# Patient Record
Sex: FEMALE | Race: WHITE | NOT HISPANIC OR LATINO | Employment: STUDENT | URBAN - METROPOLITAN AREA
[De-identification: names, ages, dates, MRNs, and addresses within clinical notes are randomized per-mention and may not be internally consistent; named-entity substitution may affect disease eponyms.]

---

## 2022-03-28 ENCOUNTER — REFERRAL (OUTPATIENT)
Dept: TRANSPLANT | Facility: CLINIC | Age: 22
End: 2022-03-28

## 2022-03-28 NOTE — TELEPHONE ENCOUNTER
"Donor Intake Start:3/14/22Donor Intake Complete:3/21/22  Expiration Date:22  Gender:FemalePreferred Language:English  Full Name:Elisa Stock  Needed:[not answered]  Phone Number:[phone] Phone:  Contact Preference:[not answered]Best Contact Time:Noon - 5pm  Emergency Contact:Cliff ChatmanOdilia Contact #:[phone]  Relationship to Contact:Contact is my parent  :02Age:21  Country:[place]  Address:[address] [place] [address] DriveCity:[place]  State:[place] Code:[id number]  Height:5'6\"Weight:125lbs  BMI:20.2  Employment Status:StudentHas PTO for donation?[not answered]  Occupation:[not answered]Requires Heavy Lifting?[not answered]  Education Level:High SchoolMarital Status:Single  Exercise Routine:4-6/WeekHealth Insurance:  Yes  Blood Type:AEthnicity/Race:White  Donor Type:Directed Donor  Prefer Remote Donation:[not answered]  Physician:Alex Barragan  [place], [place]  Donating for Recipient Transfer  Recipient's Center:[unknown]   Recipient's Name:Gomez Rojas :[birth year]  Recipient's Status:UnknownHow DD knows Recip:Other  DD communicates w/ Recip:Less Than Once A Month  Indicated possible interest in:  Motivation to donate:  I want to donate my liver to my uncle who is currently on the donor list because I don't want him to die waiting on a liver.  Living Donor Pre-Screening  [place]  [place]  Will Accept Blood Transfusions?  Yes  Has been Diagnosed with Kidney Disease?  No  Has had a Heart Attack?  No  Has Diabetes?  No  Has had Cancer?  No  Has had Kidney Stones?[not answered]  Has ever been Pregnant?  No  Is Planning on Pregnancy?  No  Is Taking Birth Control?  No  Has Used Tobacco  No  Has HIV?  No  Is Currently Incarcerated?  No  Is Currently Residing in [place]  Yes  History Misc  Has Allergies?  No  Has had Surgeries?  Yes  Surgery When  Hip Labrum repair (right) 2016  Hip labrum repair (left) May 20th 2017  Takes " Medication?  Yes  Medication Dose Frequency  Ibphrophen 2 pills as needed  Medical History  History of High BP?  Never  Has History Of CABG (bypass surgery)?  No  History of Blood Clots?  Never  History of Coronary Disease?  Never  Has Stents Implanted?  No  Has History of Chest Pain with Exercise?  No  Has History of Chest Pain at Other Times?  No  Results of Climbing 2 Flights of Stairs?No Problem  Has had Stress Test within Last Year?  No  Has had Stroke?  No  Has had Leg Bypass?  No  History of Lung Disease?  Never  History of COPD?  Never  History of TB?  Never    - Is TB Active?[not answered]  History of Pneumonia?  Never  Has Respiratory Issues?  No  Has Gastro Issues?  No  History of Gallstones?  Never  History of Pancreatitis?  Never  History of Liver Disease?[not answered]  History of Hepatitis B?[not answered]    - Is Hep B Active?[not answered]  History of Hepatitis C?[not answered]  History of Bleeding Problem?  Never  History of UTIs?  No  History of Kidney Damage?  Never  History of Proteinuria?  Never  History of Hematuria?  Never  History of Neuro Disease?  Never  History of Seizure?  Never  History of Lupus?  Never  History of Paralysis?  Never  History of Arthritis?  Never  History of Neuropathy?  Never  History of Depression?  Never  History of Anxiety?  Never  History of Documented Psychiatric Illness?  Never  History of Fibroid Uterus?  Never  History of Endometriosis?  Never  History of Polycystic Ovaries?  Never  Has had Miscarriages?  No  Has had Abortions?  No  Has had Transfusions?  No  History of Obesity?  No  History of Fabry's Disease?  No  History of Sickle Cell Disease?  No  History of Sickle Cell Trait?  No  History of Sarcoidosis?  No  History of Auto-Immune Disease  No  Has had Physical Exam?  Yes    - how many years ago:5  Has had Mammogram?  No    - how many years ago:  Has had Pap Smear?  No    - how many years ago:  Has had Colonoscopy?  No  Medical History Comments?[no  comments]  Living Donor Family Medical History  Anyone with kidney disease?  No  Anyone with liver disease?  No  Anyone with heart disease?  No  Anyone with coronary artery disease?  No  Anyone with high blood pressure?  No  Anyone with blood disorder?  No  Anyone with cancer?  No  Anyone with kidney cancer?  No  Anyone with diabetes?  Yes    - which family members:Maternal grandfather  Is mother alive?  Yes  Mother's age?48  Is father alive?  Yes  Father's age?49  How many siblings?2  How many adult children?0  How many children under 18?0  Social History  Has Used Alcohol?  Yes    - currently uses alcohol:  Yes    - how much:3/Monthly  Has Abused Alcohol?  No  Has Used Drugs?  No  Has had legal issues w/ law enforcement?  No  Traveled over 100 miles from home in last year?  Yes    - Traveled Where?[facility]  Has had suicidal thoughts or attempts in the last five years?  No

## 2022-03-29 ENCOUNTER — TELEPHONE (OUTPATIENT)
Dept: TRANSPLANT | Facility: CLINIC | Age: 22
End: 2022-03-29

## 2022-03-29 NOTE — TELEPHONE ENCOUNTER
"Received the following Email message from Elisa:    \"Hello, I have read through all the material and I have given it a lot of thought but I am a freshman at college and I don't think I'm at a place in my life where I can continue this process. Thank you so much for you're time and help and I am very sorry.\"  Responded back to Elisa acknowledging that her message was received.Thanked her for trying and understood her reasoning.Will CANCEL ERIKA call.    "